# Patient Record
Sex: MALE | Race: BLACK OR AFRICAN AMERICAN | NOT HISPANIC OR LATINO | Employment: UNEMPLOYED | URBAN - METROPOLITAN AREA
[De-identification: names, ages, dates, MRNs, and addresses within clinical notes are randomized per-mention and may not be internally consistent; named-entity substitution may affect disease eponyms.]

---

## 2021-07-05 ENCOUNTER — HOSPITAL ENCOUNTER (EMERGENCY)
Facility: CLINIC | Age: 21
Discharge: HOME | End: 2021-07-05
Attending: EMERGENCY MEDICINE | Admitting: EMERGENCY MEDICINE

## 2021-07-05 VITALS
RESPIRATION RATE: 20 BRPM | TEMPERATURE: 99.6 F | OXYGEN SATURATION: 96 % | SYSTOLIC BLOOD PRESSURE: 112 MMHG | DIASTOLIC BLOOD PRESSURE: 72 MMHG | HEART RATE: 88 BPM

## 2021-07-05 DIAGNOSIS — J06.9 VIRAL UPPER RESPIRATORY TRACT INFECTION: ICD-10-CM

## 2021-07-05 DIAGNOSIS — Z87.09 HISTORY OF REACTIVE AIRWAY DISEASE: ICD-10-CM

## 2021-07-05 LAB
DEPRECATED S PYO AG THROAT QL EIA: NEGATIVE
LABORATORY COMMENT REPORT: NORMAL
SARS-COV-2 RNA RESP QL NAA+PROBE: NEGATIVE
SPECIMEN SOURCE: NORMAL
STREP GROUP A PCR: NOT DETECTED

## 2021-07-05 PROCEDURE — 87635 SARS-COV-2 COVID-19 AMP PRB: CPT | Performed by: EMERGENCY MEDICINE

## 2021-07-05 PROCEDURE — 87651 STREP A DNA AMP PROBE: CPT | Performed by: EMERGENCY MEDICINE

## 2021-07-05 PROCEDURE — 999N001174 HC STATISTIC STREP A RAPID: Performed by: EMERGENCY MEDICINE

## 2021-07-05 PROCEDURE — 99283 EMERGENCY DEPT VISIT LOW MDM: CPT

## 2021-07-05 RX ORDER — ALBUTEROL SULFATE 90 UG/1
2 AEROSOL, METERED RESPIRATORY (INHALATION) EVERY 6 HOURS PRN
Qty: 8 G | Refills: 0 | Status: SHIPPED | OUTPATIENT
Start: 2021-07-05

## 2021-07-05 NOTE — Clinical Note
Mckenzie Mahajan was seen and treated in our emergency department on 7/5/2021.  He may return to work on 07/08/2021.  Mr. Mahajan was seen for a medical illness.  He may require the next several days off, though he may return sooner if he is feel recovered enough to perform his work duties.     If you have any questions or concerns, please don't hesitate to call.      Trierweiler, Chad A, MD

## 2021-07-05 NOTE — ED TRIAGE NOTES
Pt presents with complaints of SOB that began tonight at 2130 on his way to work. Pt reports hx of asthma. Pt denies coughing with the SOB. Pt also having sore throat. Pt reports feeling chilled and having diarrhea

## 2021-07-05 NOTE — ED PROVIDER NOTES
"  History     Chief Complaint:  Shortness of Breath       HPI   Mckenzie Mahajan is a 21 year old male with a history of mild asthma presenting to us with complaints of having upper respiratory symptoms which began earlier today.  He describes a \"scratchy throat\" along with an infrequent dry cough.  He feels some generalized achiness and has had some looser stools over the past several days.  He was supposed to go to work tonight, but he felt this generalized malaise and shortness of breath, prompting him to come to the emergency department for assessment.  He notes that he has not used an albuterol inhaler in over a year.  He denies fevers or chills.  He denies known exposures to Covid.  He believes that his throat feels most consistent with prior spells of strep throat.    Allergies:  Other Food Allergy  Other Environmental Allergy     Medications:    albuterol (PROAIR HFA/PROVENTIL HFA/VENTOLIN HFA) 108 (90 Base) MCG/ACT inhaler        Past Medical History:    No past medical history on file.    There are no active problems to display for this patient.       Past Surgical History:    No past surgical history on file.     Family History:    family history is not on file.    Social History:   reports that he has been smoking cigarettes. He has been smoking about 0.25 packs per day. He has never used smokeless tobacco. He reports current alcohol use. He reports that he does not use drugs.    PCP: No Ref-Primary, Physician     Review of Systems  Pertinent positives and negatives as above.  A 14-point review of systems was performed with all other systems reviewed as negative.      Physical Exam     Patient Vitals for the past 24 hrs:   BP Temp Temp src Pulse Resp SpO2   07/05/21 0100 112/72 -- -- 88 -- 96 %   07/05/21 0050 116/79 -- -- -- -- 95 %   07/05/21 0045 116/79 99.6  F (37.6  C) Oral 92 -- 97 %   07/05/21 0040 116/84 -- -- 98 -- 97 %   07/05/21 0021 128/76 99.1  F (37.3  C) Oral 97 20 99 %        Physical " Exam  Eye:  Pupils are equal, round, and reactive.  Extraocular movements intact.    ENT: TMs are clear bilaterally.  No rhinorrhea.  Moist mucus membranes.  Normal tongue and tonsil.    Fat: No anterior posterior cervical lymphadenopathy noted.    Cardiac:  Regular rate and rhythm.  No murmurs, gallops, or rubs.    Pulmonary:  Clear to auscultation bilaterally.  No wheezes, rales, or rhonchi.    Abdomen:  Positive bowel sounds.  Abdomen is soft and non-distended, without focal tenderness.    Musculoskeletal:  Normal movement of all extremities without evidence for deficit.    Skin:  Warm and dry without rashes.    Neurologic:  Non-focal exam without asymmetric weakness or numbness.     Psychiatric:  Normal affect with appropriate interaction with examiner.    Emergency Department Course     Laboratory:    Labs Ordered and Resulted from Time of ED Arrival Up to the Time of Departure from the ED   SARS-COV-2 (COVID-19) VIRUS RT-PCR   STREPTOCOCCUS A RAPID SCR W REFLX TO PCR   GROUP A STREPTOCOCCUS PCR THROAT SWAB        Emergency Department Course:  Past medical records, nursing notes, and vitals reviewed.  I performed an exam of the patient and obtained history, as documented above.      Impression & Plan       Medical Decision Making:  This healthy young man presents to us with concerns of having upper respiratory symptoms with a mild sore throat and a mild cough and low-grade fevers.  Strep test is negative.  Covid testing is negative.  His lungs are clear with normal oxygenation and no cough here.  I feel he is low risk for pneumonia and he does not require a chest x-ray.  He is otherwise PERC negative and I have limited concerns with or other more serious intrathoracic pathologies at play.  Plan will be for discharge home with a note for work along with an albuterol inhaler.  Questions were answered he is comfortable with the plan for discharge.  He should follow-up with his primary doctor later in the week for  reassessment with immediate return to us for any worsening of condition or other emergent concerns.    Diagnosis:    ICD-10-CM    1. Viral upper respiratory tract infection  J06.9    2. History of reactive airway disease  Z87.09         Discharge Medications:     Medication List      Started    albuterol 108 (90 Base) MCG/ACT inhaler  Commonly known as: PROAIR HFA/PROVENTIL HFA/VENTOLIN HFA  2 puffs, Inhalation, EVERY 6 HOURS PRN             7/5/2021   Chad Trierweiler, MD Trierweiler, Chad A, MD  07/05/21 1293